# Patient Record
Sex: FEMALE | Race: WHITE | NOT HISPANIC OR LATINO
[De-identification: names, ages, dates, MRNs, and addresses within clinical notes are randomized per-mention and may not be internally consistent; named-entity substitution may affect disease eponyms.]

---

## 2020-11-20 ENCOUNTER — APPOINTMENT (OUTPATIENT)
Dept: PLASTIC SURGERY | Facility: CLINIC | Age: 30
End: 2020-11-20
Payer: SELF-PAY

## 2020-11-20 VITALS
DIASTOLIC BLOOD PRESSURE: 80 MMHG | OXYGEN SATURATION: 100 % | HEART RATE: 89 BPM | TEMPERATURE: 98.2 F | HEIGHT: 68 IN | WEIGHT: 100 LBS | SYSTOLIC BLOOD PRESSURE: 123 MMHG | BODY MASS INDEX: 15.16 KG/M2 | RESPIRATION RATE: 20 BRPM

## 2020-11-20 DIAGNOSIS — M95.0 ACQUIRED DEFORMITY OF NOSE: ICD-10-CM

## 2020-11-20 PROBLEM — Z00.00 ENCOUNTER FOR PREVENTIVE HEALTH EXAMINATION: Status: ACTIVE | Noted: 2020-11-20

## 2020-11-20 PROCEDURE — 15789 CHEMICAL PEEL FACIAL DERMAL: CPT

## 2020-11-20 PROCEDURE — 99201 OFFICE OUTPATIENT NEW 10 MINUTES: CPT | Mod: NC,25

## 2020-11-20 PROCEDURE — 11950 SUBQ NJX FILLING MATRL 1CC/<: CPT

## 2020-11-20 RX ORDER — SPIRONOLACTONE 50 MG/1
50 TABLET ORAL
Refills: 0 | Status: ACTIVE | COMMUNITY

## 2020-11-20 RX ORDER — DEXTROAMPHETAMINE SACCHARATE, AMPHETAMINE ASPARTATE, DEXTROAMPHETAMINE SULFATE, AND AMPHETAMINE SULFATE 2.5; 2.5; 2.5; 2.5 MG/1; MG/1; MG/1; MG/1
10 TABLET ORAL
Refills: 0 | Status: ACTIVE | COMMUNITY

## 2020-11-20 NOTE — ASSESSMENT
[FreeTextEntry1] : mirror morphed image met with satisfaction /. pt does not desire nose that is too thin or pulls up too much.  she desires natural appearance .  All of WILLIAM 's questions were answered completely. she desires to have surgery in January 2021\par  .Planning open approach rhinoplasty with alar base adjustment bilateral for asymmetrical alar base positions

## 2020-11-20 NOTE — HISTORY OF PRESENT ILLNESS
[FreeTextEntry1] : pt is 29 y/o wf c/o nasal tip bulbous, dorsal profile too bowed, asymmetry of alar position with one side higher and tip plunges with smiling . pt denies significant airway compromise although she is told she has deviated septum.  The risks, benefits, alternatives, limitations and the permanent scars were outlined with the patient. she is a candidate for open rhinoplasty as she will require redirection of llc and intra and interdomal sutures.  she is also a candidate for alar base adjustment without reduction of nostril size